# Patient Record
Sex: MALE | Race: WHITE | NOT HISPANIC OR LATINO | Employment: FULL TIME | URBAN - METROPOLITAN AREA
[De-identification: names, ages, dates, MRNs, and addresses within clinical notes are randomized per-mention and may not be internally consistent; named-entity substitution may affect disease eponyms.]

---

## 2022-04-26 ENCOUNTER — OFFICE VISIT (OUTPATIENT)
Dept: URGENT CARE | Facility: CLINIC | Age: 32
End: 2022-04-26
Payer: OTHER MISCELLANEOUS

## 2022-04-26 VITALS
HEIGHT: 71 IN | TEMPERATURE: 98 F | RESPIRATION RATE: 16 BRPM | HEART RATE: 77 BPM | BODY MASS INDEX: 38.5 KG/M2 | SYSTOLIC BLOOD PRESSURE: 129 MMHG | DIASTOLIC BLOOD PRESSURE: 85 MMHG | OXYGEN SATURATION: 98 % | WEIGHT: 275 LBS

## 2022-04-26 DIAGNOSIS — M54.42 ACUTE BILATERAL LOW BACK PAIN WITH LEFT-SIDED SCIATICA: Primary | ICD-10-CM

## 2022-04-26 DIAGNOSIS — Z02.6 ENCOUNTER RELATED TO WORKER'S COMPENSATION CLAIM: ICD-10-CM

## 2022-04-26 PROCEDURE — 99203 PR OFFICE/OUTPT VISIT, NEW, LEVL III, 30-44 MIN: ICD-10-PCS | Mod: S$GLB,,, | Performed by: FAMILY MEDICINE

## 2022-04-26 PROCEDURE — 72110 X-RAY EXAM L-2 SPINE 4/>VWS: CPT | Mod: S$GLB,,, | Performed by: RADIOLOGY

## 2022-04-26 PROCEDURE — 99203 OFFICE O/P NEW LOW 30 MIN: CPT | Mod: S$GLB,,, | Performed by: FAMILY MEDICINE

## 2022-04-26 PROCEDURE — 72110 XR LUMBAR SPINE COMPLETE 5 VIEW: ICD-10-PCS | Mod: S$GLB,,, | Performed by: RADIOLOGY

## 2022-04-26 RX ORDER — METHYLPREDNISOLONE 4 MG/1
TABLET ORAL
COMMUNITY
Start: 2022-04-18

## 2022-04-26 RX ORDER — OLMESARTAN MEDOXOMIL 20 MG/1
TABLET ORAL
COMMUNITY

## 2022-04-26 RX ORDER — NAPROXEN 500 MG/1
500 TABLET ORAL 2 TIMES DAILY WITH MEALS
Qty: 30 TABLET | Refills: 1 | Status: SHIPPED | OUTPATIENT
Start: 2022-04-26 | End: 2023-04-26

## 2022-04-26 NOTE — PROGRESS NOTES
Subjective:       Patient ID: Frank Cerrato is a 31 y.o. male.    Chief Complaint: Back Injury    Patient works at [employer] and patient's job is [employer]  Date of initial injury: 04/18/2022  Description of injury: Lower back pain  What have you taken OTC for your symptoms: N/A  What is your current pain scale out of 10? 2/10  Pt was intially seen at [facility] in [place], [place]. Pt was on the boat when he bent over at the waist to  a 2lb wire when, before he even lifted the wire, he felt a pain in his lower back. Pt said he stood straight up leaving the wire on the ground and grabbed onto a railing so he wouldn't fall over. Pt states he initially had some tingling and weakness in his lower back.Initial pain scale 10/10. Current pain scale 2/10.     Back Pain  This is a new problem. The current episode started 1 to 4 weeks ago. The problem occurs intermittently. The problem has been gradually improving since onset. The pain is present in the sacro-iliac and thoracic spine. The quality of the pain is described as aching. The pain does not radiate. The pain is at a severity of 2/10. The pain is the same all the time. The symptoms are aggravated by position and lying down. Stiffness is present in the morning. Associated symptoms include tingling and weakness. Pertinent negatives include no abdominal pain, bladder incontinence, bowel incontinence, chest pain, dysuria, fever, headaches, leg pain, numbness, paresis, paresthesias, pelvic pain, perianal numbness or weight loss. He has tried bed rest, ice and NSAIDs for the symptoms. The treatment provided moderate relief.       Constitution: Negative for fever.   Cardiovascular: Negative for chest pain.   Gastrointestinal: Negative for abdominal pain and bowel incontinence.   Genitourinary: Negative for dysuria, bladder incontinence and pelvic pain.   Musculoskeletal: Positive for back pain.   Neurological: Negative for headaches and numbness.         Objective:      Physical Exam  Constitutional:       Appearance: Normal appearance.   Musculoskeletal:         General: Tenderness present. Normal range of motion.      Lumbar back: Tenderness present.        Back:       Comments: Dull ache pain with forward flexion at 60'  No pain with lateral bending or rotation    Pain reproduced with Slump test on the left side.    Neurological:      Mental Status: He is alert.           Assessment:       1. Acute bilateral low back pain with left-sided sciatica    2. Encounter related to worker's compensation claim        Plan:       Pain is improved but pt still with concerning findings. Will set up PT.   Medications Ordered This Encounter   Medications    naproxen (NAPROSYN) 500 MG tablet     Sig: Take 1 tablet (500 mg total) by mouth 2 (two) times daily with meals.     Dispense:  30 tablet     Refill:  1            No follow-ups on file.    X-Ray Lumbar Spine 5 View    Result Date: 4/26/2022  EXAMINATION: XR LUMBAR SPINE COMPLETE 5 VIEW CLINICAL HISTORY: Encounter for examination for insurance purposes TECHNIQUE: AP, lateral, spot and bilateral oblique views of the lumbar spine were performed. COMPARISON: None FINDINGS: There are no pars defects.  No spondylolisthesis. There is 20% height loss along the anterior margin of the T11 vertebral body.  While this is a common location for physiologic wedging, the degree of height loss raises the possibility of a mild and age-indeterminate compression fracture.  Correlation for any point tenderness is recommended. Preserved disc heights.  Relative preservation of facet joints. Electronically signed by: Rod Patrick Date:    04/26/2022 Time:    13:02

## 2022-05-10 ENCOUNTER — OFFICE VISIT (OUTPATIENT)
Dept: URGENT CARE | Facility: CLINIC | Age: 32
End: 2022-05-10
Payer: OTHER MISCELLANEOUS

## 2022-05-10 VITALS
SYSTOLIC BLOOD PRESSURE: 150 MMHG | HEIGHT: 71 IN | OXYGEN SATURATION: 98 % | RESPIRATION RATE: 16 BRPM | HEART RATE: 87 BPM | BODY MASS INDEX: 38.5 KG/M2 | TEMPERATURE: 98 F | WEIGHT: 275 LBS | DIASTOLIC BLOOD PRESSURE: 91 MMHG

## 2022-05-10 DIAGNOSIS — M54.42 ACUTE BILATERAL LOW BACK PAIN WITH LEFT-SIDED SCIATICA: Primary | ICD-10-CM

## 2022-05-10 PROCEDURE — 99214 PR OFFICE/OUTPT VISIT, EST, LEVL IV, 30-39 MIN: ICD-10-PCS | Mod: S$GLB,,, | Performed by: FAMILY MEDICINE

## 2022-05-10 PROCEDURE — 99214 OFFICE O/P EST MOD 30 MIN: CPT | Mod: S$GLB,,, | Performed by: FAMILY MEDICINE

## 2022-05-10 NOTE — PROGRESS NOTES
Subjective:       Patient ID: Frank Cerrato is a 31 y.o. male.    Chief Complaint: Back Pain    Patient works at [employer] and patient's job is [employer]  Date of initial injury: 04/18/2022  Description of injury: Lower back pain  What have you taken OTC for your symptoms: Prescribed medications  What is your current pain scale out of 10? 0/10  Pt was intially seen at [facility] in [place], [place]. Pt was on the boat when he bent over at the waist to  a 2lb wire when, before he even lifted the wire, he felt a pain in his lower back. Pt said he stood straight up leaving the wire on the ground and grabbed onto a railing so he wouldn't fall over. Pt states he initially had some tingling and weakness in his lower back.Initial pain scale 10/10. Current pain scale 0/10. Pt is ready to return to full duty work.     Back Pain  This is a new problem. The current episode started 1 to 4 weeks ago. The problem has been resolved since onset. The pain does not radiate. The pain is at a severity of 0/10. He has tried ice and bed rest for the symptoms. The treatment provided significant relief.       Musculoskeletal: Positive for back pain.        Objective:      Physical Exam    Normal ROm without pain.   Assessment:       1. Acute bilateral low back pain with left-sided sciatica        Plan:       Return to full duty.          Restrictions: Regular Duty, Discharged from Occupational Health  No follow-ups on file.

## 2022-05-10 NOTE — LETTER
[facility]  [address], [address]  [place] [address]  Phone: [phone]  Fax: [phone]  [facility]: [phone]    Pt Name: Frank Cerrato  Injury Date: 04/18/2022   Employee ID: [id number] Date of Treatment: 05/10/2022   Company: [employer]      Appointment Time: 11:45 AM Arrived: 1200PM   Provider: Curtis Barker MD Time Out: 1225PM     Office Treatment:   1. Acute bilateral low back pain with left-sided sciatica               Restrictions: Regular Duty, Discharged from Occupational Health     Return Appointment: as needed